# Patient Record
Sex: MALE | Race: WHITE
[De-identification: names, ages, dates, MRNs, and addresses within clinical notes are randomized per-mention and may not be internally consistent; named-entity substitution may affect disease eponyms.]

---

## 2020-07-22 ENCOUNTER — HOSPITAL ENCOUNTER (OUTPATIENT)
Dept: HOSPITAL 84 - D.OPS | Age: 50
Discharge: HOME | End: 2020-07-22
Attending: ORTHOPAEDIC SURGERY
Payer: COMMERCIAL

## 2020-07-22 VITALS — HEIGHT: 72 IN | BODY MASS INDEX: 33.93 KG/M2 | WEIGHT: 250.52 LBS | BODY MASS INDEX: 33.93 KG/M2

## 2020-07-22 VITALS — DIASTOLIC BLOOD PRESSURE: 87 MMHG | SYSTOLIC BLOOD PRESSURE: 135 MMHG

## 2020-07-22 DIAGNOSIS — G56.02: Primary | ICD-10-CM

## 2020-07-22 DIAGNOSIS — M79.645: ICD-10-CM

## 2020-07-22 LAB
ERYTHROCYTE [DISTWIDTH] IN BLOOD BY AUTOMATED COUNT: 14.5 % (ref 11.5–14.5)
HCT VFR BLD CALC: 44.7 % (ref 42–54)
HGB BLD-MCNC: 15.1 G/DL (ref 13.5–17.5)
MCH RBC QN AUTO: 29 PG (ref 26–34)
MCHC RBC AUTO-ENTMCNC: 33.8 G/DL (ref 31–37)
MCV RBC: 85.8 FL (ref 80–100)
PLATELET # BLD: 227 10X3/UL (ref 130–400)
PMV BLD AUTO: 9.8 FL (ref 7.4–10.4)
RBC # BLD AUTO: 5.21 10X6/UL (ref 4.2–6.1)
WBC # BLD AUTO: 10.9 10X3/UL (ref 4.8–10.8)

## 2020-07-22 NOTE — OP
PATIENT NAME:  JOSR SILVA                       MEDICAL RECORD: [id number]
:74                                             LOCATION:D.OPS          
                                                         ADMISSION DATE:        
SURGEON:  LEONEL RUEDA MD           
 
 
DATE OF OPERATION:  2020
 
PREOPERATIVE DIAGNOSIS:  Left carpal tunnel syndrome.
 
POSTOPERATIVE DIAGNOSIS:  Left carpal tunnel syndrome.
 
PROCEDURE PERFORMED:  Left carpal tunnel release.
 
INDICATIONS:  Mr. Silva is a 50-year-old male with history of bilateral carpal
tunnel syndrome, worse on the left.  He complains of numbness and tingling in
his hands, which is beginning to affect his activities.  He is also having some
weakness with his .  I talked to him about options for continued
conservative versus surgical management.  He would like to proceed with surgery
for left carpal tunnel release.  Risks, benefits, and alternatives of surgery
discussed with the patient include but not limited to pain, infection, bleeding,
damage to surrounding structures, particularly the median nerve and potential
need for further surgery.  All questions were answered.  Consent was obtained
preoperatively.
 
DESCRIPTION OF PROCEDURE:  The patient was met in the holding area where his
identity and confirmation of procedure was performed.  He was then taken to the
operating room.  He was placed supine on the operating table and anesthesia was
administered.  Tourniquet was applied to left arm and left arm was prepped and
draped in a sterile fashion.  The patient received preoperative antibiotics and
a timeout was performed before initiating the case.  On initiation of the case,
an incision was made at the palm over the carpal tunnel in line with the fourth
ray.  We incised through the skin and subcutaneous tissues dissected down to the
transverse carpal ligament.  It was then incised and a freer was inserted.  We
continued our release distally, protecting the median nerve.  We then continued
our release proximally until the ligament was completely released.  Carpal
tunnel was then explored, did not show any abnormalities, tissue within the
compartment was inflamed with some thickening of the tissue, especially around
the median nerve.  Wound was irrigated thoroughly with saline.  Hemostasis was
obtained and 0.25% Marcaine with epinephrine was then injected in the soft
tissues of the palm around our incision.  Incision was then closed with a
Prolene suture and a sterile dressing was placed.  The patient was turned back
over to anesthesia where he was awakened, extubated, and taken to recovery in
stable condition.
 
POSTOPERATIVE PLAN:  The patient is going to return home with his family.  He
needs to leave the dressing on for approximately 72 hours and then may remove
for dry dressing changes as needed.  We will plan to see him back in 2 weeks to
evaluate his progress.
 
ANESTHESIA:  General LMA.
 
COMPLICATIONS:  None.
 
ESTIMATED BLOOD LOSS:  5 mL.
 
TRANSINT:[id number] Voice Confirmation ID: [id number] DOCUMENT ID: [id number]
                                           
 
 
 
OPERATIVE REPORT                               [id number]    JOSR SILVA BRENT M MD           
 
 
 
 
 
 
 
 
 
 
 
 
 
 
 
 
 
 
 
 
 
 
 
 
 
 
 
 
 
 
 
 
 
 
 
 
 
 
 
 
 
 
 
 
 
 
CC:                                                             [id number]
DICTATION DATE: 20 1530     :     208      [facility] [place] 
                                                                      20
[address] [address]

## 2020-07-22 NOTE — NUR
1120-REMOVED IV WITH CATH INTACT,DISPOSED INTO SHARPS,COVERED WITH
MEDIPORE TAPE. VSS. PAIN 3/10. CAP REFILL WNL. STRONG REGULAR RADIAL
PULSE. NO DISTRESS OR N/V. REVIEWED POST OP INSURUCTIONS AND FOLLOW
UP APPOINTMENT WITH PT AND MOTHER AT BEDSIDE. VERBALIZED
UNDERSTANDING.

## 2020-08-27 ENCOUNTER — HOSPITAL ENCOUNTER (OUTPATIENT)
Dept: HOSPITAL 84 - D.OPS | Age: 50
Discharge: HOME | End: 2020-08-27
Attending: ORTHOPAEDIC SURGERY
Payer: COMMERCIAL

## 2020-08-27 VITALS
HEIGHT: 72 IN | BODY MASS INDEX: 33.86 KG/M2 | BODY MASS INDEX: 33.86 KG/M2 | WEIGHT: 250 LBS | WEIGHT: 250 LBS | HEIGHT: 72 IN

## 2020-08-27 VITALS — DIASTOLIC BLOOD PRESSURE: 75 MMHG | SYSTOLIC BLOOD PRESSURE: 119 MMHG

## 2020-08-27 DIAGNOSIS — G56.01: Primary | ICD-10-CM

## 2020-08-27 NOTE — OP
PATIENT NAME:  JOSR SILVA                       MEDICAL RECORD: [id number]
:74                                             LOCATION:D.OPS          
                                                         ADMISSION DATE:        
SURGEON:  LEONEL RUEDA MD           
 
 
DATE OF OPERATION:  2020
 
PREOPERATIVE DIAGNOSIS:  Right carpal tunnel syndrome.
 
POSTOPERATIVE DIAGNOSIS:  Right carpal tunnel syndrome.
 
PROCEDURE PERFORMED:  Right carpal tunnel release.
 
INDICATIONS FOR THE PROCEDURE:  Mr. Silva is a 50-year-old male with history
of right carpal tunnel syndrome.  He has been having symptoms of pain and
numbness in his hand that is getting progressively worse.  He had a left carpal
tunnel release approximately 6 weeks ago and is doing better.  He has now
decided to proceed with right carpal tunnel release.  Risks, benefits, and
alternatives of surgery were discussed with the patient.  Consent was obtained.
 
DESCRIPTION OF PROCEDURE:  The patient was met in the holding area where his
identity and confirmation of the procedure was performed.  The right upper
extremity was marked.  He was taken to the operating room where he was placed
supine on the operating table.  Anesthesia was administered.  Tourniquet was
applied to the right arm and the right arm was prepped and draped in a sterile
fashion.  The patient received preoperative antibiotics and timeout was
performed before initiating the case.  On initiation of the case, the arm was
exsanguinated and tourniquet was raised.  Total tourniquet time was 29 minutes. 
Incision was made at the palm over the carpal tunnel in line with the fourth
ray.  We incised through the skin and subcutaneous tissues, dissected down to
the transverse carpal ligament.  The ligament was then incised and the freer was
inserted.  We continued our release distally with the scalpel and scissors to
release the full ligament distally.  We then turned, continued our release
proximally with scissors to complete our release.  The carpal tunnel was then
explored.  Did not have any lesions or abnormalities.  Tourniquet was let down. 
Hemostasis was obtained.  There was inflammation and thickening of the synovium
in the carpal tunnel.  Wound was irrigated thoroughly with saline.  A 0.25%
plain Marcaine was injected in the soft tissues at the palm.  The skin was then
closed with a Prolene suture.  A sterile dressing was placed.  The patient was
turned back over to anesthesia where he was awakened, extubated, and taken to
recovery room in stable condition.
 
POSTOPERATIVE PLAN:  The patient is going to return home with his family today. 
Needs to limit his activities with that hand for the next few weeks.  We will
see him back in clinic in 2 weeks.
 
ANESTHESIA:  General.
 
COMPLICATIONS:  None.
 
ESTIMATED BLOOD LOSS:  5 mL.
 
TRANSINT:[id number] Voice Confirmation ID: [id number] DOCUMENT ID: [id number]
 
 
 
OPERATIVE REPORT                               [id number]    JOSR SILVA BRENT M MD           
 
 
 
Electronically Signed by LEONEL RUEDA on 20 at 1408
 
 
 
 
 
 
 
 
 
 
 
 
 
 
 
 
 
 
 
 
 
 
 
 
 
 
 
 
 
 
 
 
 
 
 
 
 
 
 
 
 
CC:                                                             [id number]
DICTATION DATE: 20 0951     :     20 1223      [facility] 
                                                                      20
[id number] [place] [address]